# Patient Record
Sex: MALE | Race: WHITE | NOT HISPANIC OR LATINO | Employment: FULL TIME | URBAN - NONMETROPOLITAN AREA
[De-identification: names, ages, dates, MRNs, and addresses within clinical notes are randomized per-mention and may not be internally consistent; named-entity substitution may affect disease eponyms.]

---

## 2022-04-20 RX ORDER — PROPRANOLOL HYDROCHLORIDE 40 MG/1
TABLET ORAL
Qty: 60 TABLET | Refills: 4 | OUTPATIENT
Start: 2022-04-20

## 2022-05-02 RX ORDER — ALPRAZOLAM 0.5 MG/1
TABLET ORAL
Qty: 60 TABLET | Refills: 2 | OUTPATIENT
Start: 2022-05-02

## 2022-05-19 ENCOUNTER — OFFICE VISIT (OUTPATIENT)
Dept: FAMILY MEDICINE CLINIC | Facility: CLINIC | Age: 52
End: 2022-05-19

## 2022-05-19 VITALS
OXYGEN SATURATION: 98 % | BODY MASS INDEX: 25.13 KG/M2 | WEIGHT: 179.5 LBS | SYSTOLIC BLOOD PRESSURE: 122 MMHG | HEART RATE: 58 BPM | DIASTOLIC BLOOD PRESSURE: 74 MMHG | HEIGHT: 71 IN

## 2022-05-19 DIAGNOSIS — F41.1 GENERALIZED ANXIETY DISORDER: Primary | ICD-10-CM

## 2022-05-19 PROBLEM — N40.0 BENIGN PROSTATIC HYPERPLASIA WITHOUT URINARY OBSTRUCTION: Status: ACTIVE | Noted: 2018-05-07

## 2022-05-19 PROBLEM — I10 ESSENTIAL HYPERTENSION: Status: ACTIVE | Noted: 2018-05-07

## 2022-05-19 PROBLEM — E78.5 HYPERLIPIDEMIA: Status: ACTIVE | Noted: 2018-05-07

## 2022-05-19 PROCEDURE — 99203 OFFICE O/P NEW LOW 30 MIN: CPT | Performed by: PHYSICIAN ASSISTANT

## 2022-05-19 PROCEDURE — 80305 DRUG TEST PRSMV DIR OPT OBS: CPT | Performed by: PHYSICIAN ASSISTANT

## 2022-05-19 RX ORDER — ALPRAZOLAM 0.5 MG/1
0.5 TABLET ORAL EVERY 12 HOURS
COMMUNITY
Start: 2022-03-30 | End: 2022-05-19 | Stop reason: SDUPTHER

## 2022-05-19 RX ORDER — PROPRANOLOL HYDROCHLORIDE 40 MG/1
40 TABLET ORAL 2 TIMES DAILY
COMMUNITY
Start: 2022-04-14 | End: 2022-05-23

## 2022-05-19 RX ORDER — ROSUVASTATIN CALCIUM 10 MG/1
10 TABLET, COATED ORAL DAILY
COMMUNITY
Start: 2022-04-14 | End: 2022-08-18

## 2022-05-19 RX ORDER — ALPRAZOLAM 0.5 MG/1
0.5 TABLET ORAL EVERY 12 HOURS
Qty: 90 TABLET | Refills: 2 | Status: SHIPPED | OUTPATIENT
Start: 2022-05-19 | End: 2022-08-02 | Stop reason: SDUPTHER

## 2022-05-19 NOTE — ASSESSMENT & PLAN NOTE
Patient takes xanax for chronic anxiety, has remained stable on this medication, LATANYA has been reviewed and a controlled substance agreement has been signed.

## 2022-05-19 NOTE — PROGRESS NOTES
"Chief Complaint  Anxiety    Subjective          Ugo Onofre presents to [facility]  History of Present Illness  Patient comes in today for refill of his Xanax for his anxiety,  It is well controlled and he has no other complaints.     Objective     Vital Signs:   /74 (BP Location: Right arm, Patient Position: Sitting, Cuff Size: Large Adult)   Pulse 58   Ht 180.3 cm (71\")   Wt 81.4 kg (179 lb 8 oz)   SpO2 98%   BMI 25.04 kg/m²     Body mass index is 25.04 kg/m².    Review of Systems   Constitutional: Negative.    Respiratory: Negative.    Cardiovascular: Negative.    Psychiatric/Behavioral: Negative.        Past History:  Medical History: has a past medical history of Anxiety state, Benign essential hypertension, Generalized anxiety disorder, H/O Bell's palsy, Kidney stones, and Mixed hyperlipidemia.   Surgical History: has a past surgical history that includes Kidney stone surgery and Nerve Surgery.   Family History: family history includes Diabetes in his mother; Hypertension in his mother.   Social History: reports that he has never smoked. He has never used smokeless tobacco. He reports previous alcohol use. He reports that he does not use drugs.      Current Outpatient Medications:   •  ALPRAZolam (XANAX) 0.5 MG tablet, Take 0.5 mg by mouth Every 12 (Twelve) Hours., Disp: , Rfl:   •  propranolol (INDERAL) 40 MG tablet, Take 40 mg by mouth 2 (Two) Times a Day., Disp: , Rfl:   •  rosuvastatin (CRESTOR) 10 MG tablet, Take 10 mg by mouth Daily., Disp: , Rfl:     Allergies: Codeine, Ketorolac tromethamine, and Penicillins    Physical Exam  Constitutional:       Appearance: Normal appearance.   HENT:      Head: Normocephalic.   Cardiovascular:      Rate and Rhythm: Normal rate and regular rhythm.   Pulmonary:      Effort: Pulmonary effort is normal.      Breath sounds: Normal breath sounds.   Neurological:      General: No focal deficit present.      Mental Status: He " is alert and oriented to person, place, and time.   Psychiatric:         Mood and Affect: Mood normal.             Assessment and Plan   Diagnoses and all orders for this visit:    1. Generalized anxiety disorder (Primary)  Assessment & Plan:  Patient takes xanax for chronic anxiety, has remained stable on this medication, LATANYA has been reviewed and a controlled substance agreement has been signed.     Orders:  -     POC Urine Drug Screen [facility]          Follow Up   Return in about 3 months (around 8/19/2022) for Recheck  .  Patient was given instructions and counseling regarding his condition or for health maintenance advice. Please see specific information pulled into the AVS if appropriate.     Kaci Cintron PA-C

## 2022-05-20 ENCOUNTER — TELEPHONE (OUTPATIENT)
Dept: FAMILY MEDICINE CLINIC | Facility: CLINIC | Age: 52
End: 2022-05-20

## 2022-05-20 NOTE — TELEPHONE ENCOUNTER
Pt says he was seen yesterday and we were supposed to send in his bp med  phrm says they dont have it

## 2022-05-23 ENCOUNTER — TELEPHONE (OUTPATIENT)
Dept: FAMILY MEDICINE CLINIC | Facility: CLINIC | Age: 52
End: 2022-05-23

## 2022-05-23 LAB

## 2022-05-23 RX ORDER — PROPRANOLOL HYDROCHLORIDE 40 MG/1
TABLET ORAL
Qty: 60 TABLET | Refills: 5 | Status: SHIPPED | OUTPATIENT
Start: 2022-05-23 | End: 2022-10-31

## 2022-08-02 ENCOUNTER — TELEMEDICINE (OUTPATIENT)
Dept: FAMILY MEDICINE CLINIC | Facility: CLINIC | Age: 52
End: 2022-08-02

## 2022-08-02 VITALS — BODY MASS INDEX: 24.5 KG/M2 | TEMPERATURE: 97.7 F | WEIGHT: 175 LBS | HEIGHT: 71 IN

## 2022-08-02 DIAGNOSIS — F41.1 GENERALIZED ANXIETY DISORDER: ICD-10-CM

## 2022-08-02 DIAGNOSIS — H81.10 BENIGN PAROXYSMAL POSITIONAL VERTIGO, UNSPECIFIED LATERALITY: Primary | ICD-10-CM

## 2022-08-02 PROCEDURE — 99213 OFFICE O/P EST LOW 20 MIN: CPT | Performed by: PHYSICIAN ASSISTANT

## 2022-08-02 RX ORDER — ALPRAZOLAM 0.5 MG/1
0.5 TABLET ORAL EVERY 12 HOURS
Qty: 90 TABLET | Refills: 2 | Status: SHIPPED | OUTPATIENT
Start: 2022-08-02 | End: 2023-01-06 | Stop reason: SDUPTHER

## 2022-08-02 RX ORDER — MECLIZINE HYDROCHLORIDE 25 MG/1
25 TABLET ORAL 3 TIMES DAILY PRN
Qty: 30 TABLET | Refills: 0 | Status: SHIPPED | OUTPATIENT
Start: 2022-08-02 | End: 2023-01-06

## 2022-08-02 RX ORDER — TAMSULOSIN HYDROCHLORIDE 0.4 MG/1
1 CAPSULE ORAL DAILY
COMMUNITY
Start: 2022-07-25 | End: 2023-01-06

## 2022-08-02 NOTE — PROGRESS NOTES
"Chief Complaint  Dizziness (X1 wk)    Subjective  Patient was seen today through synchronous audio/video technology using do[employer]Me technology. Verbal consent was obtained. The patient was located at home and work. Vitals signs were not obtained due to lack of home monitoring access. Time spent with patient was 20 minutes.        Ugo Onofre presents to [facility]  History of Present Illness   patient is being seen today for dizziness that is actually improving gradually.  He states that last week when he laid down he would get a spinning sensation the room would spin and he did get a little nauseated.  It has gradually improved and he was able to lie down last night without as much spinning.  He does not have spinning sensation or dizziness during the day and has not missed any work.  He also needs to go ahead and get his Xanax refilled because that is going to come due in the next few days.  He takes his Xanax for chronic anxiety.    Objective   Vital Signs:   Temp 97.7 °F (36.5 °C)   Ht 180.3 cm (71\")   Wt 79.4 kg (175 lb) Comment: Pt reported vitals.  BMI 24.41 kg/m²     Body mass index is 24.41 kg/m².    Review of Systems   Constitutional: Negative for chills, fatigue and fever.   Respiratory: Negative for cough, shortness of breath and wheezing.    Cardiovascular: Negative for chest pain and palpitations.   Musculoskeletal: Negative for gait problem.   Neurological: Positive for dizziness. Negative for syncope, facial asymmetry, speech difficulty, weakness, light-headedness, numbness, headache and confusion.   Psychiatric/Behavioral: The patient is nervous/anxious.        Past History:  Medical History: has a past medical history of Anxiety state, Benign essential hypertension, Generalized anxiety disorder, H/O Bell's palsy, Kidney stones, and Mixed hyperlipidemia.   Surgical History: has a past surgical history that includes Kidney stone surgery and Nerve Surgery. "   Family History: family history includes Diabetes in his mother; Hypertension in his mother.   Social History: reports that he has never smoked. He has never used smokeless tobacco. He reports previous alcohol use. He reports that he does not use drugs.      Current Outpatient Medications:   •  ALPRAZolam (XANAX) 0.5 MG tablet, Take 1 tablet by mouth Every 12 (Twelve) Hours., Disp: 90 tablet, Rfl: 2  •  propranolol (INDERAL) 40 MG tablet, TAKE ONE TABLET BY MOUTH TWO TIMES A DAY, Disp: 60 tablet, Rfl: 5  •  rosuvastatin (CRESTOR) 10 MG tablet, Take 10 mg by mouth Daily., Disp: , Rfl:   •  tamsulosin (FLOMAX) 0.4 MG capsule 24 hr capsule, Take 1 capsule by mouth Daily., Disp: , Rfl:   •  meclizine (ANTIVERT) 25 MG tablet, Take 1 tablet by mouth 3 (Three) Times a Day As Needed for Dizziness., Disp: 30 tablet, Rfl: 0    Allergies: Codeine, Ketorolac tromethamine, and Penicillins    Physical Exam  Constitutional:       Appearance: Normal appearance.   Neurological:      Mental Status: He is alert and oriented to person, place, and time.   Psychiatric:         Mood and Affect: Mood normal.         Behavior: Behavior normal.          Result Review :                   Assessment and Plan    Diagnoses and all orders for this visit:    1. Benign paroxysmal positional vertigo, unspecified laterality (Primary)  Assessment & Plan:  Reassurance.  I am going to treat this with some seen that he can take as needed and I explained that it should eventually resolve on its own.      2. Generalized anxiety disorder  Assessment & Plan:  Patient takes xanax for chronic anxiety, has remained stable on this medication, LATANYA has been reviewed and a controlled substance agreement has been signed.       Other orders  -     meclizine (ANTIVERT) 25 MG tablet; Take 1 tablet by mouth 3 (Three) Times a Day As Needed for Dizziness.  Dispense: 30 tablet; Refill: 0      Follow Up   No follow-ups on file.  Patient was given instructions and  counseling regarding his condition or for health maintenance advice. Please see specific information pulled into the AVS if appropriate.     Kaci Cintron PA-C

## 2022-08-02 NOTE — ASSESSMENT & PLAN NOTE
Reassurance.  I am going to treat this with some seen that he can take as needed and I explained that it should eventually resolve on its own.

## 2022-08-18 RX ORDER — ROSUVASTATIN CALCIUM 10 MG/1
TABLET, COATED ORAL
Qty: 30 TABLET | Refills: 1 | Status: SHIPPED | OUTPATIENT
Start: 2022-08-18 | End: 2022-10-19

## 2022-10-19 RX ORDER — ROSUVASTATIN CALCIUM 10 MG/1
TABLET, COATED ORAL
Qty: 30 TABLET | Refills: 1 | Status: SHIPPED | OUTPATIENT
Start: 2022-10-19 | End: 2022-11-18

## 2022-10-31 RX ORDER — PROPRANOLOL HYDROCHLORIDE 40 MG/1
TABLET ORAL
Qty: 60 TABLET | Refills: 5 | Status: SHIPPED | OUTPATIENT
Start: 2022-10-31

## 2022-11-18 RX ORDER — ROSUVASTATIN CALCIUM 10 MG/1
TABLET, COATED ORAL
Qty: 30 TABLET | Refills: 1 | Status: SHIPPED | OUTPATIENT
Start: 2022-11-18 | End: 2023-01-06 | Stop reason: SDUPTHER

## 2022-11-29 DIAGNOSIS — F41.1 GENERALIZED ANXIETY DISORDER: ICD-10-CM

## 2022-11-29 RX ORDER — ALPRAZOLAM 0.5 MG/1
0.5 TABLET ORAL EVERY 12 HOURS
Qty: 60 TABLET | Refills: 2 | OUTPATIENT
Start: 2022-11-29

## 2023-01-06 ENCOUNTER — OFFICE VISIT (OUTPATIENT)
Dept: FAMILY MEDICINE CLINIC | Facility: CLINIC | Age: 53
End: 2023-01-06
Payer: COMMERCIAL

## 2023-01-06 VITALS
HEART RATE: 57 BPM | BODY MASS INDEX: 24.41 KG/M2 | SYSTOLIC BLOOD PRESSURE: 122 MMHG | HEIGHT: 71 IN | DIASTOLIC BLOOD PRESSURE: 82 MMHG | OXYGEN SATURATION: 96 %

## 2023-01-06 DIAGNOSIS — Z12.5 PROSTATE CANCER SCREENING: ICD-10-CM

## 2023-01-06 DIAGNOSIS — Z00.00 ENCOUNTER FOR ROUTINE ADULT MEDICAL EXAMINATION: Primary | ICD-10-CM

## 2023-01-06 DIAGNOSIS — Z51.81 ENCOUNTER FOR THERAPEUTIC DRUG LEVEL MONITORING: ICD-10-CM

## 2023-01-06 DIAGNOSIS — I10 ESSENTIAL HYPERTENSION: ICD-10-CM

## 2023-01-06 DIAGNOSIS — E78.2 MIXED HYPERLIPIDEMIA: ICD-10-CM

## 2023-01-06 DIAGNOSIS — F41.1 GENERALIZED ANXIETY DISORDER: ICD-10-CM

## 2023-01-06 PROBLEM — H81.10 BPPV (BENIGN PAROXYSMAL POSITIONAL VERTIGO): Status: RESOLVED | Noted: 2022-08-02 | Resolved: 2023-01-06

## 2023-01-06 LAB
MDMA UR QL SCN: NEGATIVE
POC AMPHETAMINES: NEGATIVE
POC BARBITURATES: NEGATIVE
POC BENZODIAZEPHINES: POSITIVE
POC COCAINE: NEGATIVE
POC METHADONE: NEGATIVE
POC METHAMPHETAMINE SCREEN URINE: NEGATIVE
POC OPIATES: NEGATIVE
POC OXYCODONE: NEGATIVE
POC PHENCYCLIDINE: NEGATIVE
POC THC: NEGATIVE
POC TRICYCLIC ANTIDEPRESSANTS: NEGATIVE

## 2023-01-06 PROCEDURE — 36415 COLL VENOUS BLD VENIPUNCTURE: CPT | Performed by: PHYSICIAN ASSISTANT

## 2023-01-06 PROCEDURE — 99213 OFFICE O/P EST LOW 20 MIN: CPT | Performed by: PHYSICIAN ASSISTANT

## 2023-01-06 PROCEDURE — 99396 PREV VISIT EST AGE 40-64: CPT | Performed by: PHYSICIAN ASSISTANT

## 2023-01-06 PROCEDURE — 80305 DRUG TEST PRSMV DIR OPT OBS: CPT | Performed by: PHYSICIAN ASSISTANT

## 2023-01-06 RX ORDER — ROSUVASTATIN CALCIUM 10 MG/1
10 TABLET, COATED ORAL DAILY
Qty: 90 TABLET | Refills: 3 | Status: SHIPPED | OUTPATIENT
Start: 2023-01-06

## 2023-01-06 RX ORDER — ALPRAZOLAM 0.5 MG/1
0.5 TABLET ORAL EVERY 12 HOURS
Qty: 90 TABLET | Refills: 2 | Status: SHIPPED | OUTPATIENT
Start: 2023-01-06 | End: 2023-04-06 | Stop reason: SDUPTHER

## 2023-01-06 NOTE — ASSESSMENT & PLAN NOTE
Discussed injury prevention, diet and exercise, safe sexual practices, and screening for common diseases. Encouraged use of sunscreen and seatbelts. Discussed timing of colon cancer cancer screening, prostate cancer screening, and review of skin for lesions. Avoidance of tobacco encouraged. Limitation or avoidance of alcohol encouraged. Recommend yearly dental and eye exams. Also discussed monitoring of blood pressure and lipids. Routine screening labs ordered. Further recommendations will depend upon those results.

## 2023-01-06 NOTE — PROGRESS NOTES
Chief Complaint  Annual Exam (Here for a physical. ) and Anxiety (Requesting refill for Xanax)    Subjective          Ugo Onofre presents to [facility]  History of Present Illness patient is being seen today for his annual physical, blood work and refill on his medications.  He has no specific complaints today and reports feeling well.    Objective   Vital Signs:   /82   Pulse 57   Ht 180.3 cm (71\")   SpO2 96%   BMI 24.41 kg/m²     Body mass index is 24.41 kg/m².    Review of Systems   Constitutional: Negative.  Negative for fatigue.   HENT: Negative.    Eyes: Negative.  Negative for blurred vision.   Respiratory: Negative.  Negative for cough, chest tightness and shortness of breath.    Cardiovascular: Negative.  Negative for chest pain, palpitations and leg swelling.   Gastrointestinal: Negative.  Negative for abdominal pain, constipation, diarrhea, nausea and vomiting.   Endocrine: Negative.    Genitourinary: Negative.    Musculoskeletal: Negative.    Skin: Negative.    Allergic/Immunologic: Negative.    Neurological: Negative for dizziness, tremors and headache.   Hematological: Negative.    Psychiatric/Behavioral: Negative for depressed mood. The patient is not nervous/anxious.        Past History:  Medical History: has a past medical history of Anxiety state, Benign essential hypertension, Generalized anxiety disorder, H/O Bell's palsy, Kidney stones, and Mixed hyperlipidemia.   Surgical History: has a past surgical history that includes Kidney stone surgery and Nerve Surgery.   Family History: family history includes Diabetes in his mother; Hypertension in his mother.   Social History: reports that he has never smoked. He has never used smokeless tobacco. He reports that he does not currently use alcohol. He reports that he does not use drugs.      Current Outpatient Medications:   •  ALPRAZolam (XANAX) 0.5 MG tablet, Take 1 tablet by mouth Every 12 (Twelve)  Hours., Disp: 90 tablet, Rfl: 2  •  propranolol (INDERAL) 40 MG tablet, TAKE ONE TABLET BY MOUTH TWO TIMES A DAY, Disp: 60 tablet, Rfl: 5  •  rosuvastatin (CRESTOR) 10 MG tablet, Take 1 tablet by mouth Daily., Disp: 90 tablet, Rfl: 3    Allergies: Codeine, Ketorolac tromethamine, and Penicillins    Physical Exam  Vitals reviewed.   HENT:      Head: Normocephalic.      Nose: Nose normal.      Mouth/Throat:      Mouth: Mucous membranes are moist.   Eyes:      Pupils: Pupils are equal, round, and reactive to light.   Cardiovascular:      Rate and Rhythm: Normal rate and regular rhythm.      Pulses: Normal pulses.      Heart sounds: Normal heart sounds.   Pulmonary:      Effort: Pulmonary effort is normal.      Breath sounds: Normal breath sounds.   Abdominal:      General: Abdomen is flat. Bowel sounds are normal.      Palpations: Abdomen is soft.   Musculoskeletal:         General: Normal range of motion.      Cervical back: Normal range of motion and neck supple.   Skin:     General: Skin is warm and dry.   Neurological:      General: No focal deficit present.      Mental Status: He is alert and oriented to person, place, and time.   Psychiatric:         Mood and Affect: Mood normal.          Result Review :                   Assessment and Plan    Diagnoses and all orders for this visit:    1. Encounter for routine adult medical examination (Primary)  Assessment & Plan:  Discussed injury prevention, diet and exercise, safe sexual practices, and screening for common diseases. Encouraged use of sunscreen and seatbelts. Discussed timing of colon cancer cancer screening, prostate cancer screening, and review of skin for lesions. Avoidance of tobacco encouraged. Limitation or avoidance of alcohol encouraged. Recommend yearly dental and eye exams. Also discussed monitoring of blood pressure and lipids. Routine screening labs ordered. Further recommendations will depend upon those results.     Orders:  -     CBC &  Differential; Future  -     Comprehensive Metabolic Panel; Future  -     Lipid Panel; Future  -     TSH; Future    2. Essential hypertension  Assessment & Plan:  Hypertension is well controlled.  Continue present care.      3. Mixed hyperlipidemia  Assessment & Plan:  Routine screening labs ordered. Further recommendations will depend upon those results.       4. Generalized anxiety disorder  Assessment & Plan:  Patient takes xanax for chronic anxiety, has remained stable on this medication, LATANYA has been reviewed and a controlled substance agreement has been signed.       5. Prostate cancer screening  Assessment & Plan:  Routine screening labs ordered. Further recommendations will depend upon those results.     Orders:  -     PSA Screen; Future    Other orders  -     rosuvastatin (CRESTOR) 10 MG tablet; Take 1 tablet by mouth Daily.  Dispense: 90 tablet; Refill: 3      Follow Up   Return in about 3 months (around 4/6/2023) for  REFILL, with Tabitha Cintron.  Patient was given instructions and counseling regarding his condition or for health maintenance advice. Please see specific information pulled into the AVS if appropriate.     Kaci Cintron PA-C

## 2023-01-07 LAB
ALBUMIN SERPL-MCNC: 4.9 G/DL (ref 3.8–4.9)
ALBUMIN/GLOB SERPL: 2 {RATIO} (ref 1.2–2.2)
ALP SERPL-CCNC: 91 IU/L (ref 44–121)
ALT SERPL-CCNC: 35 IU/L (ref 0–44)
AST SERPL-CCNC: 29 IU/L (ref 0–40)
BASOPHILS # BLD AUTO: 0 X10E3/UL (ref 0–0.2)
BASOPHILS NFR BLD AUTO: 0 %
BILIRUB SERPL-MCNC: 0.9 MG/DL (ref 0–1.2)
BUN SERPL-MCNC: 14 MG/DL (ref 6–24)
BUN/CREAT SERPL: 11 (ref 9–20)
CALCIUM SERPL-MCNC: 9.8 MG/DL (ref 8.7–10.2)
CHLORIDE SERPL-SCNC: 104 MMOL/L (ref 96–106)
CHOLEST SERPL-MCNC: 168 MG/DL (ref 100–199)
CO2 SERPL-SCNC: 26 MMOL/L (ref 20–29)
CREAT SERPL-MCNC: 1.3 MG/DL (ref 0.76–1.27)
EGFRCR SERPLBLD CKD-EPI 2021: 65 ML/MIN/1.73
EOSINOPHIL # BLD AUTO: 0.2 X10E3/UL (ref 0–0.4)
EOSINOPHIL NFR BLD AUTO: 3 %
ERYTHROCYTE [DISTWIDTH] IN BLOOD BY AUTOMATED COUNT: 12.8 % (ref 11.6–15.4)
GLOBULIN SER CALC-MCNC: 2.5 G/DL (ref 1.5–4.5)
GLUCOSE SERPL-MCNC: 90 MG/DL (ref 70–99)
HCT VFR BLD AUTO: 48.4 % (ref 37.5–51)
HDLC SERPL-MCNC: 35 MG/DL
HGB BLD-MCNC: 16.5 G/DL (ref 13–17.7)
IMM GRANULOCYTES # BLD AUTO: 0 X10E3/UL (ref 0–0.1)
IMM GRANULOCYTES NFR BLD AUTO: 0 %
LDLC SERPL CALC-MCNC: 108 MG/DL (ref 0–99)
LYMPHOCYTES # BLD AUTO: 1.2 X10E3/UL (ref 0.7–3.1)
LYMPHOCYTES NFR BLD AUTO: 20 %
MCH RBC QN AUTO: 31.2 PG (ref 26.6–33)
MCHC RBC AUTO-ENTMCNC: 34.1 G/DL (ref 31.5–35.7)
MCV RBC AUTO: 92 FL (ref 79–97)
MONOCYTES # BLD AUTO: 0.4 X10E3/UL (ref 0.1–0.9)
MONOCYTES NFR BLD AUTO: 6 %
NEUTROPHILS # BLD AUTO: 4.5 X10E3/UL (ref 1.4–7)
NEUTROPHILS NFR BLD AUTO: 71 %
PLATELET # BLD AUTO: 182 X10E3/UL (ref 150–450)
POTASSIUM SERPL-SCNC: 4.5 MMOL/L (ref 3.5–5.2)
PROT SERPL-MCNC: 7.4 G/DL (ref 6–8.5)
PSA SERPL-MCNC: 0.4 NG/ML (ref 0–4)
RBC # BLD AUTO: 5.29 X10E6/UL (ref 4.14–5.8)
SODIUM SERPL-SCNC: 143 MMOL/L (ref 134–144)
TRIGL SERPL-MCNC: 140 MG/DL (ref 0–149)
TSH SERPL DL<=0.005 MIU/L-ACNC: 1.42 UIU/ML (ref 0.45–4.5)
VLDLC SERPL CALC-MCNC: 25 MG/DL (ref 5–40)
WBC # BLD AUTO: 6.3 X10E3/UL (ref 3.4–10.8)

## 2023-01-23 RX ORDER — METHOCARBAMOL 750 MG/1
TABLET, FILM COATED ORAL
Qty: 90 TABLET | Refills: 2 | Status: SHIPPED | OUTPATIENT
Start: 2023-01-23

## 2023-03-07 RX ORDER — TRIAMCINOLONE ACETONIDE 1 MG/G
CREAM TOPICAL
Qty: 60 G | Refills: 2 | Status: SHIPPED | OUTPATIENT
Start: 2023-03-07

## 2023-04-06 ENCOUNTER — PROCEDURE VISIT (OUTPATIENT)
Dept: FAMILY MEDICINE CLINIC | Facility: CLINIC | Age: 53
End: 2023-04-06
Payer: COMMERCIAL

## 2023-04-06 VITALS
HEIGHT: 71 IN | HEART RATE: 71 BPM | SYSTOLIC BLOOD PRESSURE: 118 MMHG | DIASTOLIC BLOOD PRESSURE: 70 MMHG | OXYGEN SATURATION: 98 % | BODY MASS INDEX: 26.04 KG/M2 | WEIGHT: 186 LBS

## 2023-04-06 DIAGNOSIS — F41.1 GENERALIZED ANXIETY DISORDER: Primary | ICD-10-CM

## 2023-04-06 DIAGNOSIS — D22.9 BENIGN SKIN MOLE: ICD-10-CM

## 2023-04-06 RX ORDER — ALPRAZOLAM 0.5 MG/1
0.5 TABLET ORAL EVERY 12 HOURS
Qty: 90 TABLET | Refills: 2 | Status: SHIPPED | OUTPATIENT
Start: 2023-04-06

## 2023-04-06 NOTE — PROGRESS NOTES
"Chief Complaint  Anxiety (Patient needs a refill on his XANAX) and Skin Problem (Right side of neck)    Subjective          Ugo Onofre presents to [facility]  History of Present Illness is being seen today for a refill of his Xanax which she takes for chronic anxiety and it continues to work well for him.  He also mentions that he had a \"skin tag\" on the right side of his neck that he would like me to look at apparently his necklace got tangled on it and pulled it and caused it to bleed a little bit but its healed now and he is not sure if he needs to do anything about it.  Objective   Vital Signs:   /70 (BP Location: Right arm)   Pulse 71   Ht 180.3 cm (71\")   Wt 84.4 kg (186 lb)   SpO2 98%   BMI 25.94 kg/m²     Body mass index is 25.94 kg/m².    Review of Systems   Constitutional: Negative for chills, fatigue and fever.   Respiratory: Negative for cough, shortness of breath and wheezing.    Cardiovascular: Negative for chest pain and palpitations.   Skin: Positive for skin lesions.   Neurological: Negative.    Psychiatric/Behavioral: The patient is nervous/anxious.        Past History:  Medical History: has a past medical history of Anxiety state, Benign essential hypertension, Generalized anxiety disorder, H/O Bell's palsy, Kidney stones, and Mixed hyperlipidemia.   Surgical History: has a past surgical history that includes Kidney stone surgery and Nerve Surgery.   Family History: family history includes Diabetes in his mother; Hypertension in his mother.   Social History: reports that he has never smoked. He has never used smokeless tobacco. He reports that he does not currently use alcohol. He reports that he does not use drugs.      Current Outpatient Medications:   •  ALPRAZolam (XANAX) 0.5 MG tablet, Take 1 tablet by mouth Every 12 (Twelve) Hours., Disp: 90 tablet, Rfl: 2  •  methocarbamol (ROBAXIN) 750 MG tablet, TAKE 1 TABLET BY MOUTH 3 TIMES DAILY, Disp: " 90 tablet, Rfl: 2  •  propranolol (INDERAL) 40 MG tablet, TAKE ONE TABLET BY MOUTH TWO TIMES A DAY, Disp: 60 tablet, Rfl: 5  •  rosuvastatin (CRESTOR) 10 MG tablet, Take 1 tablet by mouth Daily., Disp: 90 tablet, Rfl: 3  •  triamcinolone (KENALOG) 0.1 % cream, APPLY THIN FILM OF CREAM TO AFFECTED JOHN TWO TIMES A DAY, Disp: 60 g, Rfl: 2    Allergies: Ketorolac tromethamine, Codeine, and Penicillins    Physical Exam  Constitutional:       Appearance: Normal appearance.   HENT:      Head: Normocephalic.   Cardiovascular:      Rate and Rhythm: Normal rate and regular rhythm.   Pulmonary:      Effort: Pulmonary effort is normal.      Breath sounds: Normal breath sounds.   Skin:     Findings: Lesion (benign appearing flat mole right side of neck) present.   Neurological:      General: No focal deficit present.      Mental Status: He is alert and oriented to person, place, and time.   Psychiatric:         Mood and Affect: Mood normal.          Result Review :                   Assessment and Plan    Diagnoses and all orders for this visit:    1. Generalized anxiety disorder (Primary)  Assessment & Plan:   Patient takes xanax for chronic anxiety, has remained stable on this medication, LATANYA has been reviewed and a controlled substance agreement has been signed.       2. Benign skin mole  Assessment & Plan:  Reassurance, this appears benign, if he would like to have it removed he can schedule an appt with dermatology.          Follow Up   Return in about 3 months (around 7/6/2023) for  REFILL, with Tabitha Cintron.  Patient was given instructions and counseling regarding his condition or for health maintenance advice. Please see specific information pulled into the AVS if appropriate.     Kaci Cintron PA-C

## 2023-04-06 NOTE — ASSESSMENT & PLAN NOTE
Reassurance, this appears benign, if he would like to have it removed he can schedule an appt with dermatology.

## 2023-04-20 DIAGNOSIS — F41.1 GENERALIZED ANXIETY DISORDER: ICD-10-CM

## 2023-04-20 RX ORDER — ALPRAZOLAM 0.5 MG/1
0.5 TABLET ORAL EVERY 12 HOURS
Qty: 90 TABLET | Refills: 2 | OUTPATIENT
Start: 2023-04-20

## 2023-05-08 RX ORDER — PROPRANOLOL HYDROCHLORIDE 40 MG/1
TABLET ORAL
Qty: 60 TABLET | Refills: 5 | Status: SHIPPED | OUTPATIENT
Start: 2023-05-08

## 2023-10-12 ENCOUNTER — OFFICE VISIT (OUTPATIENT)
Dept: FAMILY MEDICINE CLINIC | Facility: CLINIC | Age: 53
End: 2023-10-12
Payer: COMMERCIAL

## 2023-10-12 VITALS
DIASTOLIC BLOOD PRESSURE: 76 MMHG | HEART RATE: 61 BPM | OXYGEN SATURATION: 96 % | WEIGHT: 176.4 LBS | HEIGHT: 71 IN | SYSTOLIC BLOOD PRESSURE: 118 MMHG | BODY MASS INDEX: 24.69 KG/M2

## 2023-10-12 DIAGNOSIS — F41.1 GENERALIZED ANXIETY DISORDER: ICD-10-CM

## 2023-10-12 PROCEDURE — 99213 OFFICE O/P EST LOW 20 MIN: CPT | Performed by: PHYSICIAN ASSISTANT

## 2023-10-12 RX ORDER — ALPRAZOLAM 0.5 MG/1
0.5 TABLET ORAL EVERY 12 HOURS
Qty: 60 TABLET | Refills: 2 | Status: SHIPPED | OUTPATIENT
Start: 2023-10-12

## 2023-10-12 RX ORDER — TRIAMCINOLONE ACETONIDE 1 MG/G
CREAM TOPICAL 2 TIMES DAILY
Qty: 60 G | Refills: 5 | Status: SHIPPED | OUTPATIENT
Start: 2023-10-12

## 2023-10-12 NOTE — PROGRESS NOTES
"Chief Complaint  Anxiety (Pt needs a refill on his XANAX)    Subjective          Ugo Onofre presents to [facility]  History of Present Illness  Patient Is here today for a refill of his Xanax which he takes for chronic anxiety and other wise he is doing fine today.      Objective   Vital Signs:   /76 (BP Location: Right arm)   Pulse 61   Ht 180.3 cm (71\")   Wt 80 kg (176 lb 6.4 oz)   SpO2 96%   BMI 24.60 kg/mý     Body mass index is 24.6 kg/mý.    Review of Systems   Constitutional:  Negative for chills, fatigue and fever.   Eyes:  Negative for blurred vision.   Respiratory:  Negative for cough, chest tightness, shortness of breath and wheezing.    Cardiovascular:  Negative for chest pain, palpitations and leg swelling.   Gastrointestinal:  Negative for abdominal pain, constipation, diarrhea, nausea and vomiting.   Neurological:  Negative for dizziness, tremors and headache.   Psychiatric/Behavioral:  Negative for depressed mood. The patient is nervous/anxious.        Past History:  Medical History: has a past medical history of Anxiety state, Benign essential hypertension, Generalized anxiety disorder, H/O Bell's palsy, Kidney stones, and Mixed hyperlipidemia.   Surgical History: has a past surgical history that includes Kidney stone surgery and Nerve Surgery.   Family History: family history includes Diabetes in his mother; Hypertension in his mother.   Social History: reports that he has never smoked. He has never used smokeless tobacco. He reports that he does not currently use alcohol. He reports that he does not use drugs.      Current Outpatient Medications:     ALPRAZolam (XANAX) 0.5 MG tablet, Take 1 tablet by mouth Every 12 (Twelve) Hours., Disp: 60 tablet, Rfl: 2    methocarbamol (ROBAXIN) 750 MG tablet, TAKE 1 TABLET BY MOUTH 3 TIMES DAILY, Disp: 90 tablet, Rfl: 2    propranolol (INDERAL) 40 MG tablet, TAKE ONE TABLET BY MOUTH TWO TIMES A DAY, Disp: 60 " tablet, Rfl: 5    rosuvastatin (CRESTOR) 10 MG tablet, Take 1 tablet by mouth Daily., Disp: 90 tablet, Rfl: 3    triamcinolone (KENALOG) 0.1 % cream, Apply  topically to the appropriate area as directed 2 (Two) Times a Day., Disp: 60 g, Rfl: 5    Allergies: Ketorolac tromethamine, Codeine, and Penicillins    Physical Exam  Constitutional:       Appearance: Normal appearance.   HENT:      Head: Normocephalic.   Cardiovascular:      Rate and Rhythm: Normal rate and regular rhythm.   Pulmonary:      Effort: Pulmonary effort is normal.      Breath sounds: Normal breath sounds.   Neurological:      General: No focal deficit present.      Mental Status: He is alert and oriented to person, place, and time.   Psychiatric:         Mood and Affect: Mood normal.          Result Review :                   Assessment and Plan    Diagnoses and all orders for this visit:    1. Generalized anxiety disorder  Assessment & Plan:  Patient takes xanax for chronic anxiety, has remained stable on this medication, LATANYA has been reviewed and a controlled substance agreement has been signed.       Other orders  -     triamcinolone (KENALOG) 0.1 % cream; Apply  topically to the appropriate area as directed 2 (Two) Times a Day.  Dispense: 60 g; Refill: 5        Follow Up   Return in about 3 months (around 1/12/2024) for Annual physical.  Patient was given instructions and counseling regarding his condition or for health maintenance advice. Please see specific information pulled into the AVS if appropriate.     Kaci Cintron PA-C

## 2023-11-27 RX ORDER — PROPRANOLOL HYDROCHLORIDE 40 MG/1
TABLET ORAL
Qty: 60 TABLET | Refills: 5 | Status: SHIPPED | OUTPATIENT
Start: 2023-11-27

## 2024-01-05 RX ORDER — METHOCARBAMOL 750 MG/1
TABLET, FILM COATED ORAL
Qty: 90 TABLET | Refills: 2 | Status: SHIPPED | OUTPATIENT
Start: 2024-01-05

## 2024-01-12 ENCOUNTER — OFFICE VISIT (OUTPATIENT)
Dept: FAMILY MEDICINE CLINIC | Facility: CLINIC | Age: 54
End: 2024-01-12
Payer: COMMERCIAL

## 2024-01-12 VITALS
DIASTOLIC BLOOD PRESSURE: 78 MMHG | SYSTOLIC BLOOD PRESSURE: 120 MMHG | HEART RATE: 77 BPM | OXYGEN SATURATION: 98 % | BODY MASS INDEX: 25.2 KG/M2 | HEIGHT: 71 IN | TEMPERATURE: 98 F | WEIGHT: 180 LBS

## 2024-01-12 DIAGNOSIS — E78.2 MIXED HYPERLIPIDEMIA: ICD-10-CM

## 2024-01-12 DIAGNOSIS — I10 ESSENTIAL HYPERTENSION: ICD-10-CM

## 2024-01-12 DIAGNOSIS — Z12.5 PROSTATE CANCER SCREENING: ICD-10-CM

## 2024-01-12 DIAGNOSIS — F41.1 GENERALIZED ANXIETY DISORDER: ICD-10-CM

## 2024-01-12 DIAGNOSIS — Z00.00 ENCOUNTER FOR ROUTINE ADULT MEDICAL EXAMINATION: Primary | ICD-10-CM

## 2024-01-12 RX ORDER — ALPRAZOLAM 0.5 MG/1
0.5 TABLET ORAL EVERY 12 HOURS
Qty: 60 TABLET | Refills: 0 | Status: SHIPPED | OUTPATIENT
Start: 2024-01-12

## 2024-01-12 RX ORDER — ROSUVASTATIN CALCIUM 10 MG/1
10 TABLET, COATED ORAL DAILY
Qty: 90 TABLET | Refills: 3 | Status: SHIPPED | OUTPATIENT
Start: 2024-01-12

## 2024-01-12 NOTE — ASSESSMENT & PLAN NOTE
Patient takes xanax for chronic anxiety, has remained stable on this medication, LATAYNA has been reviewed and a controlled substance agreement has been signed. .

## 2024-01-12 NOTE — PROGRESS NOTES
"Chief Complaint  Annual Exam    Subjective          Ugo Onofre presents to [facility]    History of Present Illness patient is here today for his annual physical, blood work and for medication refill.  He is doing well and has no specific complaints today.    Objective   Vital Signs:   /78 (BP Location: Right arm)   Pulse 77   Temp 98 °F (36.7 °C)   Ht 180.3 cm (71\")   Wt 81.6 kg (180 lb)   SpO2 98%   BMI 25.10 kg/m²     Body mass index is 25.1 kg/m².    Review of Systems   Constitutional: Negative.  Negative for fatigue.   HENT: Negative.     Eyes: Negative.  Negative for blurred vision.   Respiratory: Negative.  Negative for cough, chest tightness and shortness of breath.    Cardiovascular: Negative.  Negative for chest pain, palpitations and leg swelling.   Gastrointestinal: Negative.  Negative for abdominal pain, constipation, diarrhea, nausea and vomiting.   Endocrine: Negative.    Genitourinary: Negative.    Musculoskeletal: Negative.    Skin: Negative.    Allergic/Immunologic: Negative.    Neurological:  Negative for dizziness, tremors and headache.   Hematological: Negative.    Psychiatric/Behavioral: Negative.  Negative for depressed mood. The patient is not nervous/anxious.        Past History:  Medical History: has a past medical history of Anxiety state, Benign essential hypertension, Generalized anxiety disorder, H/O Bell's palsy, Kidney stones, and Mixed hyperlipidemia.   Surgical History: has a past surgical history that includes Kidney stone surgery and Nerve Surgery.   Family History: family history includes Diabetes in his mother; Hypertension in his mother.   Social History: reports that he has never smoked. He has never used smokeless tobacco. He reports that he does not currently use alcohol. He reports that he does not use drugs.      Current Outpatient Medications:     ALPRAZolam (XANAX) 0.5 MG tablet, Take 1 tablet by mouth Every 12 (Twelve) " Hours., Disp: 60 tablet, Rfl: 0    methocarbamol (ROBAXIN) 750 MG tablet, TAKE 1 TABLET BY MOUTH 3 TIMES DAILY, Disp: 90 tablet, Rfl: 2    propranolol (INDERAL) 40 MG tablet, TAKE ONE TABLET BY MOUTH TWO TIMES A DAY, Disp: 60 tablet, Rfl: 5    rosuvastatin (CRESTOR) 10 MG tablet, Take 1 tablet by mouth Daily., Disp: 90 tablet, Rfl: 3    triamcinolone (KENALOG) 0.1 % cream, Apply  topically to the appropriate area as directed 2 (Two) Times a Day., Disp: 60 g, Rfl: 5    Allergies: Ketorolac tromethamine, Codeine, and Penicillins    Physical Exam  Vitals reviewed.   Constitutional:       Appearance: Normal appearance.   HENT:      Head: Normocephalic and atraumatic.      Right Ear: Tympanic membrane, ear canal and external ear normal.      Left Ear: Tympanic membrane, ear canal and external ear normal.      Nose: Nose normal.      Mouth/Throat:      Mouth: Mucous membranes are moist.   Eyes:      Extraocular Movements: Extraocular movements intact.      Pupils: Pupils are equal, round, and reactive to light.   Cardiovascular:      Rate and Rhythm: Normal rate and regular rhythm.      Pulses: Normal pulses.      Heart sounds: Normal heart sounds.   Pulmonary:      Effort: Pulmonary effort is normal.      Breath sounds: Normal breath sounds.   Abdominal:      General: Abdomen is flat. Bowel sounds are normal.      Palpations: Abdomen is soft.   Musculoskeletal:         General: Normal range of motion.      Cervical back: Normal range of motion and neck supple.   Skin:     General: Skin is warm and dry.   Neurological:      General: No focal deficit present.      Mental Status: He is alert and oriented to person, place, and time.   Psychiatric:         Mood and Affect: Mood normal.         Behavior: Behavior normal.          Result Review :                   Assessment and Plan    Diagnoses and all orders for this visit:    1. Encounter for routine adult medical examination (Primary)  Assessment & Plan:  Discussed injury  prevention, diet and exercise, safe sexual practices, and screening for common diseases. Encouraged use of sunscreen and seatbelts. Discussed timing of colon cancer cancer screening, prostate cancer screening, and review of skin for lesions. Avoidance of tobacco encouraged. Limitation or avoidance of alcohol encouraged. Recommend yearly dental and eye exams. Also discussed monitoring of blood pressure and lipids.     Orders:  -     CBC & Differential; Future  -     Comprehensive Metabolic Panel; Future  -     Hemoglobin A1c; Future  -     Lipid Panel; Future  -     TSH; Future  -     Hepatitis C antibody; Future  -     CBC & Differential  -     Comprehensive Metabolic Panel  -     Hemoglobin A1c  -     Lipid Panel  -     TSH  -     Hepatitis C antibody    2. Generalized anxiety disorder  Assessment & Plan:  Patient takes xanax for chronic anxiety, has remained stable on this medication, LATANYA has been reviewed and a controlled substance agreement has been signed. .    Orders:  -     ALPRAZolam (XANAX) 0.5 MG tablet; Take 1 tablet by mouth Every 12 (Twelve) Hours.  Dispense: 60 tablet; Refill: 0    3. Prostate cancer screening  Assessment & Plan:  Routine screening labs ordered. Further recommendations will depend upon those results.     Orders:  -     PSA Screen; Future  -     PSA Screen    4. Essential hypertension  Assessment & Plan:   Continue present care no changes meds recently refilled..      5. Mixed hyperlipidemia  Assessment & Plan:  Continue present care no changes meds recently refilled..      Other orders  -     rosuvastatin (CRESTOR) 10 MG tablet; Take 1 tablet by mouth Daily.  Dispense: 90 tablet; Refill: 3        Follow Up   Return in about 6 months (around 7/12/2024) for  REFILL.  Patient was given instructions and counseling regarding his condition or for health maintenance advice. Please see specific information pulled into the AVS if appropriate.     Kaci Cintron PA-C

## 2024-01-13 LAB
ALBUMIN SERPL-MCNC: 4.8 G/DL (ref 3.8–4.9)
ALBUMIN/GLOB SERPL: 1.9 {RATIO} (ref 1.2–2.2)
ALP SERPL-CCNC: 92 IU/L (ref 44–121)
ALT SERPL-CCNC: 44 IU/L (ref 0–44)
AST SERPL-CCNC: 28 IU/L (ref 0–40)
BASOPHILS # BLD AUTO: 0 X10E3/UL (ref 0–0.2)
BASOPHILS NFR BLD AUTO: 1 %
BILIRUB SERPL-MCNC: 1 MG/DL (ref 0–1.2)
BUN SERPL-MCNC: 16 MG/DL (ref 6–24)
BUN/CREAT SERPL: 14 (ref 9–20)
CALCIUM SERPL-MCNC: 9.8 MG/DL (ref 8.7–10.2)
CHLORIDE SERPL-SCNC: 106 MMOL/L (ref 96–106)
CHOLEST SERPL-MCNC: 152 MG/DL (ref 100–199)
CO2 SERPL-SCNC: 24 MMOL/L (ref 20–29)
CREAT SERPL-MCNC: 1.14 MG/DL (ref 0.76–1.27)
EGFRCR SERPLBLD CKD-EPI 2021: 75 ML/MIN/1.73
EOSINOPHIL # BLD AUTO: 0.1 X10E3/UL (ref 0–0.4)
EOSINOPHIL NFR BLD AUTO: 2 %
ERYTHROCYTE [DISTWIDTH] IN BLOOD BY AUTOMATED COUNT: 12.7 % (ref 11.6–15.4)
GLOBULIN SER CALC-MCNC: 2.5 G/DL (ref 1.5–4.5)
GLUCOSE SERPL-MCNC: 85 MG/DL (ref 70–99)
HBA1C MFR BLD: 5.3 % (ref 4.8–5.6)
HCT VFR BLD AUTO: 47.6 % (ref 37.5–51)
HDLC SERPL-MCNC: 38 MG/DL
HGB BLD-MCNC: 16.3 G/DL (ref 13–17.7)
IMM GRANULOCYTES # BLD AUTO: 0 X10E3/UL (ref 0–0.1)
IMM GRANULOCYTES NFR BLD AUTO: 0 %
LDLC SERPL CALC-MCNC: 91 MG/DL (ref 0–99)
LYMPHOCYTES # BLD AUTO: 1.2 X10E3/UL (ref 0.7–3.1)
LYMPHOCYTES NFR BLD AUTO: 22 %
MCH RBC QN AUTO: 30.8 PG (ref 26.6–33)
MCHC RBC AUTO-ENTMCNC: 34.2 G/DL (ref 31.5–35.7)
MCV RBC AUTO: 90 FL (ref 79–97)
MONOCYTES # BLD AUTO: 0.4 X10E3/UL (ref 0.1–0.9)
MONOCYTES NFR BLD AUTO: 7 %
NEUTROPHILS # BLD AUTO: 3.9 X10E3/UL (ref 1.4–7)
NEUTROPHILS NFR BLD AUTO: 68 %
PLATELET # BLD AUTO: 194 X10E3/UL (ref 150–450)
POTASSIUM SERPL-SCNC: 4.4 MMOL/L (ref 3.5–5.2)
PROT SERPL-MCNC: 7.3 G/DL (ref 6–8.5)
RBC # BLD AUTO: 5.29 X10E6/UL (ref 4.14–5.8)
SODIUM SERPL-SCNC: 145 MMOL/L (ref 134–144)
TRIGL SERPL-MCNC: 125 MG/DL (ref 0–149)
TSH SERPL DL<=0.005 MIU/L-ACNC: 1.13 UIU/ML (ref 0.45–4.5)
VLDLC SERPL CALC-MCNC: 23 MG/DL (ref 5–40)
WBC # BLD AUTO: 5.7 X10E3/UL (ref 3.4–10.8)

## 2024-02-15 RX ORDER — TAMSULOSIN HYDROCHLORIDE 0.4 MG/1
1 CAPSULE ORAL DAILY
Qty: 30 CAPSULE | Refills: 2 | Status: SHIPPED | OUTPATIENT
Start: 2024-02-15

## 2024-02-21 DIAGNOSIS — F41.1 GENERALIZED ANXIETY DISORDER: ICD-10-CM

## 2024-02-22 RX ORDER — ALPRAZOLAM 0.5 MG/1
0.5 TABLET ORAL EVERY 12 HOURS
Qty: 60 TABLET | Refills: 0 | Status: SHIPPED | OUTPATIENT
Start: 2024-02-22

## 2024-03-28 DIAGNOSIS — F41.1 GENERALIZED ANXIETY DISORDER: ICD-10-CM

## 2024-03-28 RX ORDER — ALPRAZOLAM 0.5 MG/1
0.5 TABLET ORAL EVERY 12 HOURS
Qty: 60 TABLET | Refills: 0 | Status: SHIPPED | OUTPATIENT
Start: 2024-03-28 | End: 2024-03-29 | Stop reason: SDUPTHER

## 2024-03-29 DIAGNOSIS — F41.1 GENERALIZED ANXIETY DISORDER: ICD-10-CM

## 2024-04-01 RX ORDER — ALPRAZOLAM 0.5 MG/1
0.5 TABLET ORAL EVERY 12 HOURS
Qty: 60 TABLET | Refills: 0 | Status: SHIPPED | OUTPATIENT
Start: 2024-04-01

## 2024-04-23 DIAGNOSIS — F41.1 GENERALIZED ANXIETY DISORDER: ICD-10-CM

## 2024-04-23 RX ORDER — ALPRAZOLAM 0.5 MG/1
0.5 TABLET ORAL EVERY 12 HOURS
Qty: 60 TABLET | Refills: 0 | Status: SHIPPED | OUTPATIENT
Start: 2024-04-23

## 2024-05-29 DIAGNOSIS — F41.1 GENERALIZED ANXIETY DISORDER: ICD-10-CM

## 2024-05-29 RX ORDER — ALPRAZOLAM 0.5 MG/1
0.5 TABLET ORAL EVERY 12 HOURS
Qty: 60 TABLET | Refills: 0 | Status: SHIPPED | OUTPATIENT
Start: 2024-05-29

## 2024-06-25 DIAGNOSIS — F41.1 GENERALIZED ANXIETY DISORDER: ICD-10-CM

## 2024-06-26 RX ORDER — ALPRAZOLAM 0.5 MG/1
0.5 TABLET ORAL EVERY 12 HOURS
Qty: 60 TABLET | Refills: 0 | Status: SHIPPED | OUTPATIENT
Start: 2024-06-26

## 2024-07-12 ENCOUNTER — OFFICE VISIT (OUTPATIENT)
Dept: FAMILY MEDICINE CLINIC | Facility: CLINIC | Age: 54
End: 2024-07-12
Payer: COMMERCIAL

## 2024-07-12 VITALS
BODY MASS INDEX: 25.19 KG/M2 | HEART RATE: 78 BPM | HEIGHT: 71 IN | OXYGEN SATURATION: 98 % | SYSTOLIC BLOOD PRESSURE: 122 MMHG | DIASTOLIC BLOOD PRESSURE: 80 MMHG | WEIGHT: 179.9 LBS

## 2024-07-12 DIAGNOSIS — E78.2 MIXED HYPERLIPIDEMIA: ICD-10-CM

## 2024-07-12 DIAGNOSIS — F41.1 GENERALIZED ANXIETY DISORDER: ICD-10-CM

## 2024-07-12 DIAGNOSIS — Z51.81 THERAPEUTIC DRUG MONITORING: ICD-10-CM

## 2024-07-12 DIAGNOSIS — I10 ESSENTIAL HYPERTENSION: Primary | ICD-10-CM

## 2024-07-12 LAB
AMPHET+METHAMPHET UR QL: NEGATIVE
AMPHETAMINE INTERNAL CONTROL: ABNORMAL
AMPHETAMINES UR QL: NEGATIVE
BARBITURATE INTERNAL CONTROL: ABNORMAL
BARBITURATES UR QL SCN: NEGATIVE
BENZODIAZ UR QL SCN: POSITIVE
BENZODIAZEPINE INTERNAL CONTROL: ABNORMAL
BUPRENORPHINE INTERNAL CONTROL: ABNORMAL
BUPRENORPHINE SERPL-MCNC: NEGATIVE NG/ML
CANNABINOIDS SERPL QL: NEGATIVE
COCAINE INTERNAL CONTROL: ABNORMAL
COCAINE UR QL: NEGATIVE
EXPIRATION DATE: ABNORMAL
Lab: ABNORMAL
MDMA (ECSTASY) INTERNAL CONTROL: ABNORMAL
MDMA UR QL SCN: NEGATIVE
METHADONE INTERNAL CONTROL: ABNORMAL
METHADONE UR QL SCN: NEGATIVE
METHAMPHETAMINE INTERNAL CONTROL: ABNORMAL
MORPHINE INTERNAL CONTROL: ABNORMAL
MORPHINE/OPIATES SCREEN, URINE: NEGATIVE
OXYCODONE INTERNAL CONTROL: ABNORMAL
OXYCODONE UR QL SCN: NEGATIVE
PCP UR QL SCN: NEGATIVE
PHENCYCLIDINE INTERNAL CONTROL: ABNORMAL
THC INTERNAL CONTROL: ABNORMAL

## 2024-07-12 PROCEDURE — 99214 OFFICE O/P EST MOD 30 MIN: CPT | Performed by: PHYSICIAN ASSISTANT

## 2024-07-12 RX ORDER — PROPRANOLOL HYDROCHLORIDE 40 MG/1
40 TABLET ORAL 2 TIMES DAILY
Qty: 180 TABLET | Refills: 1 | Status: SHIPPED | OUTPATIENT
Start: 2024-07-12

## 2024-07-12 RX ORDER — ALPRAZOLAM 0.5 MG/1
0.5 TABLET ORAL EVERY 12 HOURS
Qty: 60 TABLET | Refills: 0 | Status: SHIPPED | OUTPATIENT
Start: 2024-07-12

## 2024-07-12 RX ORDER — PROPRANOLOL HYDROCHLORIDE 40 MG/1
TABLET ORAL
Qty: 60 TABLET | Refills: 5 | OUTPATIENT
Start: 2024-07-12

## 2024-07-12 NOTE — PROGRESS NOTES
"Chief Complaint  Anxiety, Hyperlipidemia, and Hypertension    Subjective          Ugo Onofre presents to [facility]    History of Present Illness is here today for 6-month follow-up he needs a refill of his Xanax which she takes for chronic anxiety and possibly his blood pressure or cholesterol medicine he is not sure.  He did have all of his blood work done at his physical in January and everything was fine so I told him we did not need to repeat that today.  He feels fine he has no specific complaints today.    Objective   Vital Signs:   /80 (BP Location: Left arm, Patient Position: Sitting, Cuff Size: Adult)   Pulse 78   Ht 180.3 cm (71\")   Wt 81.6 kg (179 lb 14.4 oz)   SpO2 98%   BMI 25.09 kg/m²     Body mass index is 25.09 kg/m².    Review of Systems   Constitutional:  Negative for fatigue.   Eyes:  Negative for blurred vision.   Respiratory:  Negative for cough, chest tightness and shortness of breath.    Cardiovascular:  Negative for chest pain, palpitations and leg swelling.   Gastrointestinal:  Negative for abdominal pain, constipation, diarrhea, nausea and vomiting.   Neurological:  Negative for dizziness, tremors and headache.   Psychiatric/Behavioral:  Negative for depressed mood. The patient is not nervous/anxious.        Past History:  Medical History: has a past medical history of Anxiety state, Benign essential hypertension, Generalized anxiety disorder, H/O Bell's palsy, Kidney stones, and Mixed hyperlipidemia.   Surgical History: has a past surgical history that includes Kidney stone surgery and Nerve Surgery.   Family History: family history includes Diabetes in his mother; Hypertension in his mother.   Social History: reports that he has never smoked. He has never used smokeless tobacco. He reports that he does not currently use alcohol. He reports that he does not use drugs.      Current Outpatient Medications:     ALPRAZolam (XANAX) 0.5 MG " tablet, Take 1 tablet by mouth Every 12 (Twelve) Hours., Disp: 60 tablet, Rfl: 0    methocarbamol (ROBAXIN) 750 MG tablet, TAKE 1 TABLET BY MOUTH 3 TIMES DAILY, Disp: 90 tablet, Rfl: 2    propranolol (INDERAL) 40 MG tablet, Take 1 tablet by mouth 2 (Two) Times a Day., Disp: 180 tablet, Rfl: 1    rosuvastatin (CRESTOR) 10 MG tablet, Take 1 tablet by mouth Daily., Disp: 90 tablet, Rfl: 3    tamsulosin (FLOMAX) 0.4 MG capsule 24 hr capsule, Take 1 capsule by mouth Daily., Disp: 30 capsule, Rfl: 2    triamcinolone (KENALOG) 0.1 % cream, Apply  topically to the appropriate area as directed 2 (Two) Times a Day., Disp: 60 g, Rfl: 5    Allergies: Ketorolac tromethamine, Codeine, and Penicillins    Physical Exam  Vitals reviewed.   Constitutional:       Appearance: Normal appearance.   Cardiovascular:      Rate and Rhythm: Normal rate and regular rhythm.      Heart sounds: Normal heart sounds.   Pulmonary:      Effort: Pulmonary effort is normal.      Breath sounds: Normal breath sounds.   Abdominal:      General: Bowel sounds are normal.      Palpations: Abdomen is soft.   Musculoskeletal:         General: Normal range of motion.   Neurological:      General: No focal deficit present.      Mental Status: He is alert and oriented to person, place, and time.   Psychiatric:         Mood and Affect: Mood normal.          Result Review :                   Assessment and Plan    Diagnoses and all orders for this visit:    1. Essential hypertension (Primary)  Assessment & Plan:  Hypertension is stable and controlled  Continue current treatment regimen.  Blood pressure will be reassessed in 6 months.      2. Generalized anxiety disorder  Assessment & Plan:  Patient takes xanax for chronic anxiety, has remained stable on this medication, LATANYA has been reviewed and a controlled substance agreement has been signed.     Orders:  -     ALPRAZolam (XANAX) 0.5 MG tablet; Take 1 tablet by mouth Every 12 (Twelve) Hours.  Dispense: 60  tablet; Refill: 0    3. Mixed hyperlipidemia  Assessment & Plan:   Continue present care no changes.      Other orders  -     propranolol (INDERAL) 40 MG tablet; Take 1 tablet by mouth 2 (Two) Times a Day.  Dispense: 180 tablet; Refill: 1        Follow Up   Return in about 6 months (around 1/12/2025) for Annual physical.  Patient was given instructions and counseling regarding his condition or for health maintenance advice. Please see specific information pulled into the AVS if appropriate.     Kaci Cintron PA-C  Answers submitted by the patient for this visit:  Primary Reason for Visit (Submitted on 7/5/2024)  What is the primary reason for your visit?: Other  Other (Submitted on 7/5/2024)  Please describe your symptoms.: Refill  Have you had these symptoms before?: No  How long have you been having these symptoms?: 1-4 days  Please list any medications you are currently taking for this condition.: N/a  Please describe any probable cause for these symptoms. : N/a

## 2024-07-26 DIAGNOSIS — F41.1 GENERALIZED ANXIETY DISORDER: ICD-10-CM

## 2024-07-26 RX ORDER — ALPRAZOLAM 0.5 MG/1
0.5 TABLET ORAL EVERY 12 HOURS
Qty: 60 TABLET | Refills: 0 | Status: SHIPPED | OUTPATIENT
Start: 2024-07-26

## 2024-07-26 RX ORDER — ROSUVASTATIN CALCIUM 10 MG/1
10 TABLET, COATED ORAL DAILY
Qty: 90 TABLET | Refills: 3 | Status: SHIPPED | OUTPATIENT
Start: 2024-07-26

## 2024-08-21 DIAGNOSIS — F41.1 GENERALIZED ANXIETY DISORDER: ICD-10-CM

## 2024-08-21 RX ORDER — ALPRAZOLAM 0.5 MG/1
0.5 TABLET ORAL EVERY 12 HOURS
Qty: 60 TABLET | Refills: 0 | Status: SHIPPED | OUTPATIENT
Start: 2024-08-21

## 2024-09-23 DIAGNOSIS — F41.1 GENERALIZED ANXIETY DISORDER: ICD-10-CM

## 2024-09-24 RX ORDER — ALPRAZOLAM 0.5 MG
0.5 TABLET ORAL EVERY 12 HOURS
Qty: 60 TABLET | Refills: 0 | Status: SHIPPED | OUTPATIENT
Start: 2024-09-24

## 2024-10-23 DIAGNOSIS — F41.1 GENERALIZED ANXIETY DISORDER: ICD-10-CM

## 2024-10-24 RX ORDER — ALPRAZOLAM 0.5 MG
0.5 TABLET ORAL EVERY 12 HOURS
Qty: 60 TABLET | Refills: 0 | Status: SHIPPED | OUTPATIENT
Start: 2024-10-24

## 2024-11-18 RX ORDER — TAMSULOSIN HYDROCHLORIDE 0.4 MG/1
1 CAPSULE ORAL DAILY
Qty: 30 CAPSULE | Refills: 2 | Status: SHIPPED | OUTPATIENT
Start: 2024-11-18

## 2024-12-29 DIAGNOSIS — F41.1 GENERALIZED ANXIETY DISORDER: ICD-10-CM

## 2024-12-30 RX ORDER — ALPRAZOLAM 0.5 MG
0.5 TABLET ORAL EVERY 12 HOURS
Qty: 60 TABLET | Refills: 0 | Status: SHIPPED | OUTPATIENT
Start: 2024-12-30

## 2025-01-13 ENCOUNTER — OFFICE VISIT (OUTPATIENT)
Dept: FAMILY MEDICINE CLINIC | Facility: CLINIC | Age: 55
End: 2025-01-13
Payer: COMMERCIAL

## 2025-01-13 VITALS
HEART RATE: 76 BPM | DIASTOLIC BLOOD PRESSURE: 76 MMHG | WEIGHT: 189 LBS | SYSTOLIC BLOOD PRESSURE: 118 MMHG | BODY MASS INDEX: 26.46 KG/M2 | HEIGHT: 71 IN | OXYGEN SATURATION: 97 %

## 2025-01-13 DIAGNOSIS — E78.2 MIXED HYPERLIPIDEMIA: ICD-10-CM

## 2025-01-13 DIAGNOSIS — I10 ESSENTIAL HYPERTENSION: ICD-10-CM

## 2025-01-13 DIAGNOSIS — F41.1 GENERALIZED ANXIETY DISORDER: ICD-10-CM

## 2025-01-13 DIAGNOSIS — N40.0 BENIGN PROSTATIC HYPERPLASIA WITHOUT URINARY OBSTRUCTION: ICD-10-CM

## 2025-01-13 DIAGNOSIS — Z00.00 ENCOUNTER FOR ROUTINE ADULT MEDICAL EXAMINATION: Primary | ICD-10-CM

## 2025-01-13 DIAGNOSIS — Z12.5 PROSTATE CANCER SCREENING: ICD-10-CM

## 2025-01-13 DIAGNOSIS — E66.3 OVERWEIGHT (BMI 25.0-29.9): ICD-10-CM

## 2025-01-13 PROCEDURE — 99396 PREV VISIT EST AGE 40-64: CPT | Performed by: PHYSICIAN ASSISTANT

## 2025-01-13 NOTE — PROGRESS NOTES
"Chief Complaint  Annual Exam (Patient is not fasting today)    Subjective          Ugo Onofre presents to [facility]    History of Present Illness patient is here today for his annual physical, blood work and medication refills.  He reports that he is feeling well and has no specific complaints today.    Objective   Vital Signs:   /76 (BP Location: Right arm, Patient Position: Sitting, Cuff Size: Adult)   Pulse 76   Ht 180.3 cm (71\")   Wt 85.7 kg (189 lb)   SpO2 97%   BMI 26.36 kg/m²     Body mass index is 26.36 kg/m².    Review of Systems   Constitutional: Negative.  Negative for fatigue.   HENT: Negative.     Eyes: Negative.  Negative for blurred vision.   Respiratory: Negative.  Negative for cough, chest tightness and shortness of breath.    Cardiovascular: Negative.  Negative for chest pain, palpitations and leg swelling.   Gastrointestinal: Negative.  Negative for abdominal pain, constipation, diarrhea, nausea and vomiting.   Endocrine: Negative.    Genitourinary: Negative.    Musculoskeletal: Negative.    Skin: Negative.    Allergic/Immunologic: Negative.    Neurological:  Negative for dizziness, tremors and headache.   Hematological: Negative.    Psychiatric/Behavioral: Negative.  Negative for depressed mood. The patient is not nervous/anxious.        Past History:  Medical History: has a past medical history of Anxiety state, Benign essential hypertension, Generalized anxiety disorder, H/O Bell's palsy, Kidney stones, and Mixed hyperlipidemia.   Surgical History: has a past surgical history that includes Kidney stone surgery and Nerve Surgery.   Family History: family history includes Diabetes in his mother; Hypertension in his mother.   Social History: reports that he has never smoked. He has never used smokeless tobacco. He reports that he does not currently use alcohol. He reports that he does not use drugs.      Current Outpatient Medications:     " ALPRAZolam (XANAX) 0.5 MG tablet, Take 1 tablet by mouth Every 12 (Twelve) Hours., Disp: 60 tablet, Rfl: 0    methocarbamol (ROBAXIN) 750 MG tablet, TAKE 1 TABLET BY MOUTH 3 TIMES DAILY, Disp: 90 tablet, Rfl: 2    propranolol (INDERAL) 40 MG tablet, Take 1 tablet by mouth 2 (Two) Times a Day., Disp: 180 tablet, Rfl: 1    rosuvastatin (CRESTOR) 10 MG tablet, Take 1 tablet by mouth Daily., Disp: 90 tablet, Rfl: 3    tamsulosin (FLOMAX) 0.4 MG capsule 24 hr capsule, TAKE 1 CAPSULE BY MOUTH DAILY., Disp: 30 capsule, Rfl: 2    triamcinolone (KENALOG) 0.1 % cream, Apply  topically to the appropriate area as directed 2 (Two) Times a Day., Disp: 60 g, Rfl: 5    Allergies: Ketorolac tromethamine, Codeine, and Penicillins    Physical Exam  Vitals reviewed.   Constitutional:       Appearance: Normal appearance.   HENT:      Head: Normocephalic and atraumatic.      Right Ear: Tympanic membrane, ear canal and external ear normal.      Left Ear: Tympanic membrane, ear canal and external ear normal.      Nose: Nose normal.      Mouth/Throat:      Mouth: Mucous membranes are moist.   Eyes:      Extraocular Movements: Extraocular movements intact.      Pupils: Pupils are equal, round, and reactive to light.   Cardiovascular:      Rate and Rhythm: Normal rate and regular rhythm.      Pulses: Normal pulses.      Heart sounds: Normal heart sounds.   Pulmonary:      Effort: Pulmonary effort is normal.      Breath sounds: Normal breath sounds.   Abdominal:      General: Abdomen is flat. Bowel sounds are normal.      Palpations: Abdomen is soft.   Musculoskeletal:         General: Normal range of motion.      Cervical back: Normal range of motion and neck supple.   Skin:     General: Skin is warm and dry.   Neurological:      General: No focal deficit present.      Mental Status: He is alert and oriented to person, place, and time.   Psychiatric:         Mood and Affect: Mood normal.         Behavior: Behavior normal.          Result  Review :                   Assessment and Plan    Diagnoses and all orders for this visit:    1. Encounter for routine adult medical examination (Primary)  Assessment & Plan:  Discussed injury prevention, diet and exercise, safe sexual practices, and screening for common diseases. Encouraged use of sunscreen and seatbelts. Discussed timing of colon cancer cancer screening, prostate cancer screening, and review of skin for lesions. Avoidance of tobacco encouraged. Limitation or avoidance of alcohol encouraged. Recommend yearly dental and eye exams. Also discussed monitoring of blood pressure and lipids.     Orders:  -     CBC & Differential; Future  -     Comprehensive Metabolic Panel; Future  -     Hemoglobin A1c; Future  -     Lipid Panel; Future  -     TSH; Future  -     Hepatitis C antibody; Future  -     Hepatitis C antibody  -     TSH  -     Lipid Panel  -     Hemoglobin A1c  -     Comprehensive Metabolic Panel  -     CBC & Differential    2. Essential hypertension  Assessment & Plan:  Hypertension is stable and controlled  Continue current treatment regimen.  Blood pressure will be reassessed in 6 months.      3. Mixed hyperlipidemia  Assessment & Plan:   Routine screening labs ordered. Further recommendations will depend upon those results. Continue present care no changes meds refilled.      4. Benign prostatic hyperplasia without urinary obstruction  Assessment & Plan:  Continue present care no changes meds refilled.      5. Generalized anxiety disorder  Assessment & Plan:  Patient takes xanax for chronic anxiety, has remained stable on this medication, LATANYA has been reviewed and a controlled substance agreement has been signed.       6. Prostate cancer screening  -     PSA Screen; Future  -     PSA Screen    7. Overweight (BMI 25.0-29.9)  Assessment & Plan:  Patient's (Body mass index is 26.36 kg/m².) indicates that they are overweight with health conditions that include hypertension and dyslipidemias .  Weight is unchanged. BMI is above average; BMI management plan is completed. We discussed low calorie, low carb based diet program, portion control, and increasing exercise.           Follow Up   Return in about 6 months (around 7/13/2025) for  REFILL, Recheck.  Patient was given instructions and counseling regarding his condition or for health maintenance advice. Please see specific information pulled into the AVS if appropriate.     Kaci Cintron PA-C

## 2025-01-13 NOTE — ASSESSMENT & PLAN NOTE
Patient's (Body mass index is 26.36 kg/m².) indicates that they are overweight with health conditions that include hypertension and dyslipidemias . Weight is unchanged. BMI is above average; BMI management plan is completed. We discussed low calorie, low carb based diet program, portion control, and increasing exercise.

## 2025-01-13 NOTE — ASSESSMENT & PLAN NOTE
Routine screening labs ordered. Further recommendations will depend upon those results. Continue present care no changes meds refilled.

## 2025-01-14 LAB
ALBUMIN SERPL-MCNC: 4.6 G/DL (ref 3.8–4.9)
ALP SERPL-CCNC: 110 IU/L (ref 44–121)
ALT SERPL-CCNC: 37 IU/L (ref 0–44)
AST SERPL-CCNC: 28 IU/L (ref 0–40)
BASOPHILS # BLD AUTO: 0 X10E3/UL (ref 0–0.2)
BASOPHILS NFR BLD AUTO: 1 %
BILIRUB SERPL-MCNC: 1 MG/DL (ref 0–1.2)
BUN SERPL-MCNC: 13 MG/DL (ref 6–24)
BUN/CREAT SERPL: 11 (ref 9–20)
CALCIUM SERPL-MCNC: 9.7 MG/DL (ref 8.7–10.2)
CHLORIDE SERPL-SCNC: 105 MMOL/L (ref 96–106)
CHOLEST SERPL-MCNC: 158 MG/DL (ref 100–199)
CO2 SERPL-SCNC: 22 MMOL/L (ref 20–29)
CREAT SERPL-MCNC: 1.2 MG/DL (ref 0.76–1.27)
EGFRCR SERPLBLD CKD-EPI 2021: 71 ML/MIN/1.73
EOSINOPHIL # BLD AUTO: 0.2 X10E3/UL (ref 0–0.4)
EOSINOPHIL NFR BLD AUTO: 3 %
ERYTHROCYTE [DISTWIDTH] IN BLOOD BY AUTOMATED COUNT: 12.5 % (ref 11.6–15.4)
GLOBULIN SER CALC-MCNC: 2.9 G/DL (ref 1.5–4.5)
GLUCOSE SERPL-MCNC: 85 MG/DL (ref 70–99)
HBA1C MFR BLD: 5.1 % (ref 4.8–5.6)
HCT VFR BLD AUTO: 47.7 % (ref 37.5–51)
HCV IGG SERPL QL IA: NON REACTIVE
HDLC SERPL-MCNC: 31 MG/DL
HGB BLD-MCNC: 16.5 G/DL (ref 13–17.7)
IMM GRANULOCYTES # BLD AUTO: 0 X10E3/UL (ref 0–0.1)
IMM GRANULOCYTES NFR BLD AUTO: 0 %
LDLC SERPL CALC-MCNC: 99 MG/DL (ref 0–99)
LYMPHOCYTES # BLD AUTO: 1.2 X10E3/UL (ref 0.7–3.1)
LYMPHOCYTES NFR BLD AUTO: 23 %
MCH RBC QN AUTO: 31.1 PG (ref 26.6–33)
MCHC RBC AUTO-ENTMCNC: 34.6 G/DL (ref 31.5–35.7)
MCV RBC AUTO: 90 FL (ref 79–97)
MONOCYTES # BLD AUTO: 0.4 X10E3/UL (ref 0.1–0.9)
MONOCYTES NFR BLD AUTO: 7 %
NEUTROPHILS # BLD AUTO: 3.5 X10E3/UL (ref 1.4–7)
NEUTROPHILS NFR BLD AUTO: 66 %
PLATELET # BLD AUTO: 186 X10E3/UL (ref 150–450)
POTASSIUM SERPL-SCNC: 4 MMOL/L (ref 3.5–5.2)
PROT SERPL-MCNC: 7.5 G/DL (ref 6–8.5)
PSA SERPL-MCNC: 0.6 NG/ML (ref 0–4)
RBC # BLD AUTO: 5.3 X10E6/UL (ref 4.14–5.8)
SODIUM SERPL-SCNC: 142 MMOL/L (ref 134–144)
TRIGL SERPL-MCNC: 159 MG/DL (ref 0–149)
TSH SERPL DL<=0.005 MIU/L-ACNC: 1.02 UIU/ML (ref 0.45–4.5)
VLDLC SERPL CALC-MCNC: 28 MG/DL (ref 5–40)
WBC # BLD AUTO: 5.3 X10E3/UL (ref 3.4–10.8)

## 2025-01-15 ENCOUNTER — TELEPHONE (OUTPATIENT)
Dept: FAMILY MEDICINE CLINIC | Facility: CLINIC | Age: 55
End: 2025-01-15
Payer: COMMERCIAL

## 2025-01-15 NOTE — TELEPHONE ENCOUNTER
Name: Kingston Ugo LUDWIG      Relationship: Self      Best Callback Number: [phone]       HUB PROVIDED THE RELAY MESSAGE FROM THE OFFICE      PATIENT: VOICED UNDERSTANDING AND HAS NO FURTHER QUESTIONS AT THIS TIME    ADDITIONAL INFORMATION:

## 2025-01-15 NOTE — TELEPHONE ENCOUNTER
Called 1x and left vcm     HUB RELAY     Kaci Cintron PA-C  1/14/2025  3:42 PM EST       His blood work is normal no sign of diabetes, liver or kidney problems his cholesterol, prostate blood work and thyroid test were all normal.

## 2025-01-17 RX ORDER — PROPRANOLOL HYDROCHLORIDE 40 MG/1
40 TABLET ORAL 2 TIMES DAILY
Qty: 180 TABLET | Refills: 1 | Status: SHIPPED | OUTPATIENT
Start: 2025-01-17

## 2025-01-30 DIAGNOSIS — F41.1 GENERALIZED ANXIETY DISORDER: ICD-10-CM

## 2025-01-30 RX ORDER — ALPRAZOLAM 0.5 MG
0.5 TABLET ORAL EVERY 12 HOURS
Qty: 60 TABLET | Refills: 0 | Status: SHIPPED | OUTPATIENT
Start: 2025-01-30

## 2025-03-04 DIAGNOSIS — F41.1 GENERALIZED ANXIETY DISORDER: ICD-10-CM

## 2025-03-06 RX ORDER — TRIAMCINOLONE ACETONIDE 1 MG/G
CREAM TOPICAL 2 TIMES DAILY
Qty: 60 G | Refills: 5 | Status: SHIPPED | OUTPATIENT
Start: 2025-03-06

## 2025-03-06 RX ORDER — ALPRAZOLAM 0.5 MG
0.5 TABLET ORAL EVERY 12 HOURS
Qty: 60 TABLET | Refills: 0 | Status: SHIPPED | OUTPATIENT
Start: 2025-03-06

## 2025-03-11 ENCOUNTER — TRANSCRIBE ORDERS (OUTPATIENT)
Dept: GENERAL RADIOLOGY | Facility: CLINIC | Age: 55
End: 2025-03-11
Payer: COMMERCIAL
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
## 2025-03-11 DIAGNOSIS — M25.542 PAIN IN JOINT OF LEFT HAND: Primary | ICD-10-CM

## 2025-04-01 DIAGNOSIS — F41.1 GENERALIZED ANXIETY DISORDER: ICD-10-CM

## 2025-04-02 RX ORDER — ALPRAZOLAM 0.5 MG
0.5 TABLET ORAL EVERY 12 HOURS
Qty: 60 TABLET | Refills: 0 | Status: SHIPPED | OUTPATIENT
Start: 2025-04-02

## 2025-04-02 RX ORDER — TRIAMCINOLONE ACETONIDE 1 MG/G
CREAM TOPICAL 2 TIMES DAILY
Qty: 60 G | Refills: 5 | Status: SHIPPED | OUTPATIENT
Start: 2025-04-02

## 2025-04-02 RX ORDER — METHOCARBAMOL 750 MG/1
750 TABLET, FILM COATED ORAL 3 TIMES DAILY
Qty: 90 TABLET | Refills: 2 | Status: SHIPPED | OUTPATIENT
Start: 2025-04-02

## 2025-05-07 DIAGNOSIS — F41.1 GENERALIZED ANXIETY DISORDER: ICD-10-CM

## 2025-05-08 RX ORDER — ALPRAZOLAM 0.5 MG
0.5 TABLET ORAL EVERY 12 HOURS
Qty: 60 TABLET | Refills: 0 | Status: SHIPPED | OUTPATIENT
Start: 2025-05-08

## 2025-06-17 DIAGNOSIS — F41.1 GENERALIZED ANXIETY DISORDER: ICD-10-CM

## 2025-06-17 RX ORDER — ALPRAZOLAM 0.5 MG
0.5 TABLET ORAL EVERY 12 HOURS
Qty: 60 TABLET | Refills: 0 | Status: SHIPPED | OUTPATIENT
Start: 2025-06-17

## 2025-07-12 DIAGNOSIS — F41.1 GENERALIZED ANXIETY DISORDER: ICD-10-CM

## 2025-07-12 RX ORDER — ALPRAZOLAM 0.5 MG
0.5 TABLET ORAL EVERY 12 HOURS
Qty: 60 TABLET | Refills: 0 | Status: CANCELLED | OUTPATIENT
Start: 2025-07-12

## 2025-07-14 ENCOUNTER — OFFICE VISIT (OUTPATIENT)
Dept: FAMILY MEDICINE CLINIC | Facility: CLINIC | Age: 55
End: 2025-07-14
Payer: COMMERCIAL

## 2025-07-14 VITALS
HEIGHT: 71 IN | BODY MASS INDEX: 25.81 KG/M2 | HEART RATE: 55 BPM | SYSTOLIC BLOOD PRESSURE: 130 MMHG | WEIGHT: 184.4 LBS | DIASTOLIC BLOOD PRESSURE: 78 MMHG | OXYGEN SATURATION: 94 %

## 2025-07-14 DIAGNOSIS — F41.1 GENERALIZED ANXIETY DISORDER: Primary | ICD-10-CM

## 2025-07-14 DIAGNOSIS — Z51.81 THERAPEUTIC DRUG MONITORING: ICD-10-CM

## 2025-07-14 PROCEDURE — 99213 OFFICE O/P EST LOW 20 MIN: CPT | Performed by: PHYSICIAN ASSISTANT

## 2025-07-14 RX ORDER — ALPRAZOLAM 0.5 MG
0.5 TABLET ORAL EVERY 12 HOURS
Qty: 60 TABLET | Refills: 0 | Status: SHIPPED | OUTPATIENT
Start: 2025-07-14

## 2025-07-14 NOTE — PROGRESS NOTES
"Chief Complaint  Med Refill ( refill)    Subjective          Ugo Onofre presents to [facility]  History of Present Illness  patient is here today for his 6 months follow up for anxiety for refills of his xanax.  He has done well on this medication for several years.      Objective   Vital Signs:   /78 (BP Location: Left arm, Patient Position: Sitting, Cuff Size: Large Adult)   Pulse 55   Ht 180.3 cm (70.98\")   Wt 83.6 kg (184 lb 6.4 oz)   SpO2 94%   BMI 25.73 kg/m²     Body mass index is 25.73 kg/m².    Review of Systems   Constitutional:  Negative for fatigue, unexpected weight gain and unexpected weight loss.   HENT: Negative.     Eyes:  Negative for blurred vision.   Respiratory:  Negative for shortness of breath.    Cardiovascular:  Negative for chest pain and palpitations.   Gastrointestinal:  Negative for nausea.   Neurological:  Negative for dizziness and confusion.   Psychiatric/Behavioral:  The patient is nervous/anxious.        Past History:  Medical History: has a past medical history of Anxiety state, Benign essential hypertension, Generalized anxiety disorder, H/O Bell's palsy, Kidney stones, and Mixed hyperlipidemia.   Surgical History: has a past surgical history that includes Kidney stone surgery and Nerve Surgery.   Family History: family history includes Diabetes in his mother; Hypertension in his mother.   Social History: reports that he has never smoked. He has never used smokeless tobacco. He reports that he does not currently use alcohol. He reports that he does not use drugs.      Current Outpatient Medications:     ALPRAZolam (XANAX) 0.5 MG tablet, Take 1 tablet by mouth Every 12 (Twelve) Hours., Disp: 60 tablet, Rfl: 0    methocarbamol (ROBAXIN) 750 MG tablet, Take 1 tablet by mouth 3 (Three) Times a Day., Disp: 90 tablet, Rfl: 2    propranolol (INDERAL) 40 MG tablet, TAKE ONE TABLET BY MOUTH TWICE DAILY, Disp: 180 tablet, Rfl: 1    " rosuvastatin (CRESTOR) 10 MG tablet, Take 1 tablet by mouth Daily., Disp: 90 tablet, Rfl: 3    tamsulosin (FLOMAX) 0.4 MG capsule 24 hr capsule, TAKE 1 CAPSULE BY MOUTH DAILY., Disp: 30 capsule, Rfl: 2    triamcinolone (KENALOG) 0.1 % cream, Apply  topically to the appropriate area as directed 2 (Two) Times a Day., Disp: 60 g, Rfl: 5  Allergies: Ketorolac tromethamine, Codeine, and Penicillins    Physical Exam  Constitutional:       Appearance: Normal appearance.   Neurological:      Mental Status: He is alert and oriented to person, place, and time.   Psychiatric:         Mood and Affect: Mood normal.         Behavior: Behavior normal.          Procedures         Assessment and Plan   Diagnoses and all orders for this visit:    1. Therapeutic drug monitoring (Primary)  -     POC Medline 12 Panel Urine Drug Screen    2. Generalized anxiety disorder  Assessment & Plan:  Patient takes xanax for chronic anxiety, has remained stable on this medication, LATANYA has been reviewed and a controlled substance agreement has been signed.     Orders:  -     ALPRAZolam (XANAX) 0.5 MG tablet; Take 1 tablet by mouth Every 12 (Twelve) Hours.  Dispense: 60 tablet; Refill: 0            Follow Up   No follow-ups on file.  Patient was given instructions and counseling regarding his condition or for health maintenance advice. Please see specific information pulled into the AVS if appropriate.       Kaci Cintron PA-C

## 2025-07-22 RX ORDER — ROSUVASTATIN CALCIUM 10 MG/1
10 TABLET, COATED ORAL DAILY
Qty: 90 TABLET | Refills: 3 | Status: SHIPPED | OUTPATIENT
Start: 2025-07-22

## 2025-08-12 DIAGNOSIS — F41.1 GENERALIZED ANXIETY DISORDER: ICD-10-CM

## 2025-08-12 RX ORDER — TRIAMCINOLONE ACETONIDE 1 MG/G
CREAM TOPICAL 2 TIMES DAILY
Qty: 60 G | Refills: 5 | Status: SHIPPED | OUTPATIENT
Start: 2025-08-12

## 2025-08-14 ENCOUNTER — OFFICE VISIT (OUTPATIENT)
Dept: FAMILY MEDICINE CLINIC | Facility: CLINIC | Age: 55
End: 2025-08-14
Payer: COMMERCIAL

## 2025-08-14 VITALS
BODY MASS INDEX: 25.66 KG/M2 | OXYGEN SATURATION: 96 % | SYSTOLIC BLOOD PRESSURE: 134 MMHG | DIASTOLIC BLOOD PRESSURE: 88 MMHG | HEART RATE: 72 BPM | HEIGHT: 71 IN | WEIGHT: 183.3 LBS

## 2025-08-14 DIAGNOSIS — R10.11 RIGHT UPPER QUADRANT ABDOMINAL PAIN: Primary | ICD-10-CM

## 2025-08-14 DIAGNOSIS — Z87.442 HISTORY OF KIDNEY STONES: ICD-10-CM

## 2025-08-14 LAB
BILIRUB BLD-MCNC: ABNORMAL MG/DL
EXPIRATION DATE: ABNORMAL
GLUCOSE UR STRIP-MCNC: NEGATIVE MG/DL
KETONES UR QL: NEGATIVE
LEUKOCYTE EST, POC: NEGATIVE
Lab: ABNORMAL
NITRITE UR-MCNC: NEGATIVE MG/ML
PH UR: 6 [PH] (ref 5–8)
PROT UR STRIP-MCNC: ABNORMAL MG/DL
RBC # UR STRIP: NEGATIVE /UL
SP GR UR: 1.02 (ref 1–1.03)
UROBILINOGEN UR QL: NORMAL

## 2025-08-14 PROCEDURE — 81003 URINALYSIS AUTO W/O SCOPE: CPT | Performed by: PHYSICIAN ASSISTANT

## 2025-08-14 PROCEDURE — 99214 OFFICE O/P EST MOD 30 MIN: CPT | Performed by: PHYSICIAN ASSISTANT

## 2025-08-14 RX ORDER — PANTOPRAZOLE SODIUM 20 MG/1
20 TABLET, DELAYED RELEASE ORAL DAILY
Qty: 30 TABLET | Refills: 2 | Status: SHIPPED | OUTPATIENT
Start: 2025-08-14

## 2025-08-14 RX ORDER — ALPRAZOLAM 0.5 MG
0.5 TABLET ORAL EVERY 12 HOURS
Qty: 60 TABLET | Refills: 0 | Status: SHIPPED | OUTPATIENT
Start: 2025-08-14

## 2025-08-15 LAB
ALBUMIN SERPL-MCNC: 4.4 G/DL (ref 3.8–4.9)
ALP SERPL-CCNC: 94 IU/L (ref 44–121)
ALT SERPL-CCNC: 33 IU/L (ref 0–44)
AST SERPL-CCNC: 25 IU/L (ref 0–40)
BASOPHILS # BLD AUTO: 0 X10E3/UL (ref 0–0.2)
BASOPHILS NFR BLD AUTO: 1 %
BILIRUB DIRECT SERPL-MCNC: 0.29 MG/DL (ref 0–0.4)
BILIRUB SERPL-MCNC: 1.1 MG/DL (ref 0–1.2)
BUN SERPL-MCNC: 14 MG/DL (ref 6–24)
BUN/CREAT SERPL: 11 (ref 9–20)
CALCIUM SERPL-MCNC: 9.8 MG/DL (ref 8.7–10.2)
CHLORIDE SERPL-SCNC: 103 MMOL/L (ref 96–106)
CO2 SERPL-SCNC: 24 MMOL/L (ref 20–29)
CREAT SERPL-MCNC: 1.25 MG/DL (ref 0.76–1.27)
EGFRCR SERPLBLD CKD-EPI 2021: 67 ML/MIN/1.73
EOSINOPHIL # BLD AUTO: 0.2 X10E3/UL (ref 0–0.4)
EOSINOPHIL NFR BLD AUTO: 4 %
ERYTHROCYTE [DISTWIDTH] IN BLOOD BY AUTOMATED COUNT: 12.5 % (ref 11.6–15.4)
GLUCOSE SERPL-MCNC: 101 MG/DL (ref 70–99)
HCT VFR BLD AUTO: 45.9 % (ref 37.5–51)
HGB BLD-MCNC: 15.3 G/DL (ref 13–17.7)
IMM GRANULOCYTES # BLD AUTO: 0 X10E3/UL (ref 0–0.1)
IMM GRANULOCYTES NFR BLD AUTO: 0 %
LYMPHOCYTES # BLD AUTO: 1.5 X10E3/UL (ref 0.7–3.1)
LYMPHOCYTES NFR BLD AUTO: 25 %
MCH RBC QN AUTO: 31.2 PG (ref 26.6–33)
MCHC RBC AUTO-ENTMCNC: 33.3 G/DL (ref 31.5–35.7)
MCV RBC AUTO: 94 FL (ref 79–97)
MONOCYTES # BLD AUTO: 0.3 X10E3/UL (ref 0.1–0.9)
MONOCYTES NFR BLD AUTO: 6 %
NEUTROPHILS # BLD AUTO: 4 X10E3/UL (ref 1.4–7)
NEUTROPHILS NFR BLD AUTO: 64 %
PLATELET # BLD AUTO: 175 X10E3/UL (ref 150–450)
POTASSIUM SERPL-SCNC: 3.8 MMOL/L (ref 3.5–5.2)
PROT SERPL-MCNC: 7 G/DL (ref 6–8.5)
RBC # BLD AUTO: 4.91 X10E6/UL (ref 4.14–5.8)
SODIUM SERPL-SCNC: 140 MMOL/L (ref 134–144)
WBC # BLD AUTO: 6 X10E3/UL (ref 3.4–10.8)

## 2025-08-25 RX ORDER — PROPRANOLOL HYDROCHLORIDE 40 MG/1
40 TABLET ORAL 2 TIMES DAILY
Qty: 180 TABLET | Refills: 1 | Status: SHIPPED | OUTPATIENT
Start: 2025-08-25

## 2025-08-28 ENCOUNTER — RESULTS FOLLOW-UP (OUTPATIENT)
Dept: FAMILY MEDICINE CLINIC | Facility: CLINIC | Age: 55
End: 2025-08-28
Payer: COMMERCIAL